# Patient Record
Sex: FEMALE | Race: WHITE | NOT HISPANIC OR LATINO | URBAN - METROPOLITAN AREA
[De-identification: names, ages, dates, MRNs, and addresses within clinical notes are randomized per-mention and may not be internally consistent; named-entity substitution may affect disease eponyms.]

---

## 2018-07-22 ENCOUNTER — INPATIENT (INPATIENT)
Facility: HOSPITAL | Age: 23
LOS: 1 days | Discharge: HOME | End: 2018-07-24
Attending: INTERNAL MEDICINE | Admitting: INTERNAL MEDICINE

## 2018-07-22 VITALS
HEART RATE: 98 BPM | RESPIRATION RATE: 20 BRPM | TEMPERATURE: 98 F | OXYGEN SATURATION: 100 % | WEIGHT: 169.98 LBS | DIASTOLIC BLOOD PRESSURE: 78 MMHG | SYSTOLIC BLOOD PRESSURE: 124 MMHG | HEIGHT: 66 IN

## 2018-07-22 LAB
ACETONE SERPL-MCNC: ABNORMAL
ALBUMIN SERPL ELPH-MCNC: 4.7 G/DL — SIGNIFICANT CHANGE UP (ref 3.5–5.2)
ALP SERPL-CCNC: 66 U/L — SIGNIFICANT CHANGE UP (ref 30–115)
ALT FLD-CCNC: 16 U/L — SIGNIFICANT CHANGE UP (ref 0–41)
ANION GAP SERPL CALC-SCNC: 26 MMOL/L — HIGH (ref 7–14)
APPEARANCE UR: CLEAR — SIGNIFICANT CHANGE UP
AST SERPL-CCNC: 37 U/L — SIGNIFICANT CHANGE UP (ref 0–41)
BASE EXCESS BLDV CALC-SCNC: -6.1 MMOL/L — LOW (ref -2–2)
BASOPHILS # BLD AUTO: 0.02 K/UL — SIGNIFICANT CHANGE UP (ref 0–0.2)
BASOPHILS NFR BLD AUTO: 0.1 % — SIGNIFICANT CHANGE UP (ref 0–1)
BILIRUB DIRECT SERPL-MCNC: <0.2 MG/DL — SIGNIFICANT CHANGE UP (ref 0–0.2)
BILIRUB INDIRECT FLD-MCNC: >0.6 MG/DL — SIGNIFICANT CHANGE UP (ref 0.2–1.2)
BILIRUB SERPL-MCNC: 0.8 MG/DL — SIGNIFICANT CHANGE UP (ref 0.2–1.2)
BILIRUB UR-MCNC: NEGATIVE — SIGNIFICANT CHANGE UP
BUN SERPL-MCNC: 18 MG/DL — SIGNIFICANT CHANGE UP (ref 10–20)
CA-I SERPL-SCNC: 1.17 MMOL/L — SIGNIFICANT CHANGE UP (ref 1.12–1.3)
CALCIUM SERPL-MCNC: 9.8 MG/DL — SIGNIFICANT CHANGE UP (ref 8.5–10.1)
CHLORIDE SERPL-SCNC: 91 MMOL/L — LOW (ref 98–110)
CO2 SERPL-SCNC: 17 MMOL/L — SIGNIFICANT CHANGE UP (ref 17–32)
COLOR SPEC: YELLOW — SIGNIFICANT CHANGE UP
CREAT SERPL-MCNC: 1.2 MG/DL — SIGNIFICANT CHANGE UP (ref 0.7–1.5)
DIFF PNL FLD: ABNORMAL
EOSINOPHIL # BLD AUTO: 0 K/UL — SIGNIFICANT CHANGE UP (ref 0–0.7)
EOSINOPHIL NFR BLD AUTO: 0 % — SIGNIFICANT CHANGE UP (ref 0–8)
GAS PNL BLDV: 134 MMOL/L — LOW (ref 136–145)
GAS PNL BLDV: SIGNIFICANT CHANGE UP
GLUCOSE SERPL-MCNC: 421 MG/DL — HIGH (ref 70–99)
GLUCOSE UR QL: >=1000
HCO3 BLDV-SCNC: 20 MMOL/L — LOW (ref 22–29)
HCT VFR BLD CALC: 38.7 % — SIGNIFICANT CHANGE UP (ref 37–47)
HCT VFR BLDA CALC: 42.3 % — SIGNIFICANT CHANGE UP (ref 34–44)
HGB BLD CALC-MCNC: 13.8 G/DL — LOW (ref 14–18)
HGB BLD-MCNC: 13.3 G/DL — SIGNIFICANT CHANGE UP (ref 12–16)
IMM GRANULOCYTES NFR BLD AUTO: 0.3 % — SIGNIFICANT CHANGE UP (ref 0.1–0.3)
KETONES UR-MCNC: >=80
LACTATE BLDV-MCNC: 2.9 MMOL/L — HIGH (ref 0.5–1.6)
LACTATE SERPL-SCNC: 3.1 MMOL/L — HIGH (ref 0.5–2.2)
LEUKOCYTE ESTERASE UR-ACNC: ABNORMAL
LIDOCAIN IGE QN: 12 U/L — SIGNIFICANT CHANGE UP (ref 7–60)
LYMPHOCYTES # BLD AUTO: 0.75 K/UL — LOW (ref 1.2–3.4)
LYMPHOCYTES # BLD AUTO: 4.8 % — LOW (ref 20.5–51.1)
MCHC RBC-ENTMCNC: 29.7 PG — SIGNIFICANT CHANGE UP (ref 27–31)
MCHC RBC-ENTMCNC: 34.4 G/DL — SIGNIFICANT CHANGE UP (ref 32–37)
MCV RBC AUTO: 86.4 FL — SIGNIFICANT CHANGE UP (ref 81–99)
MONOCYTES # BLD AUTO: 0.32 K/UL — SIGNIFICANT CHANGE UP (ref 0.1–0.6)
MONOCYTES NFR BLD AUTO: 2 % — SIGNIFICANT CHANGE UP (ref 1.7–9.3)
NEUTROPHILS # BLD AUTO: 14.48 K/UL — HIGH (ref 1.4–6.5)
NEUTROPHILS NFR BLD AUTO: 92.8 % — HIGH (ref 42.2–75.2)
NITRITE UR-MCNC: NEGATIVE — SIGNIFICANT CHANGE UP
NRBC # BLD: 0 /100 WBCS — SIGNIFICANT CHANGE UP (ref 0–0)
PCO2 BLDV: 43 MMHG — SIGNIFICANT CHANGE UP (ref 41–51)
PH BLDV: 7.29 — SIGNIFICANT CHANGE UP (ref 7.26–7.43)
PH UR: 6 — SIGNIFICANT CHANGE UP (ref 5–8)
PLATELET # BLD AUTO: 248 K/UL — SIGNIFICANT CHANGE UP (ref 130–400)
PO2 BLDV: 38 MMHG — SIGNIFICANT CHANGE UP (ref 20–40)
POTASSIUM BLDV-SCNC: 6 MMOL/L — HIGH (ref 3.3–5.6)
POTASSIUM SERPL-MCNC: 7 MMOL/L — CRITICAL HIGH (ref 3.5–5)
POTASSIUM SERPL-SCNC: 7 MMOL/L — CRITICAL HIGH (ref 3.5–5)
PROT SERPL-MCNC: 8.7 G/DL — HIGH (ref 6–8)
PROT UR-MCNC: NEGATIVE — SIGNIFICANT CHANGE UP
RBC # BLD: 4.48 M/UL — SIGNIFICANT CHANGE UP (ref 4.2–5.4)
RBC # FLD: 11.7 % — SIGNIFICANT CHANGE UP (ref 11.5–14.5)
SAO2 % BLDV: 66 % — SIGNIFICANT CHANGE UP
SODIUM SERPL-SCNC: 134 MMOL/L — LOW (ref 135–146)
SP GR SPEC: 1.02 — SIGNIFICANT CHANGE UP (ref 1.01–1.03)
UROBILINOGEN FLD QL: 0.2 — SIGNIFICANT CHANGE UP (ref 0.2–0.2)
WBC # BLD: 15.62 K/UL — HIGH (ref 4.8–10.8)
WBC # FLD AUTO: 15.62 K/UL — HIGH (ref 4.8–10.8)

## 2018-07-22 RX ORDER — INSULIN HUMAN 100 [IU]/ML
8 INJECTION, SOLUTION SUBCUTANEOUS ONCE
Qty: 0 | Refills: 0 | Status: DISCONTINUED | OUTPATIENT
Start: 2018-07-22 | End: 2018-07-22

## 2018-07-22 RX ORDER — SODIUM CHLORIDE 9 MG/ML
1000 INJECTION, SOLUTION INTRAVENOUS
Qty: 0 | Refills: 0 | Status: DISCONTINUED | OUTPATIENT
Start: 2018-07-22 | End: 2018-07-23

## 2018-07-22 RX ORDER — INSULIN HUMAN 100 [IU]/ML
7 INJECTION, SOLUTION SUBCUTANEOUS
Qty: 100 | Refills: 0 | Status: DISCONTINUED | OUTPATIENT
Start: 2018-07-22 | End: 2018-07-23

## 2018-07-22 RX ORDER — ONDANSETRON 8 MG/1
4 TABLET, FILM COATED ORAL ONCE
Qty: 0 | Refills: 0 | Status: COMPLETED | OUTPATIENT
Start: 2018-07-22 | End: 2018-07-22

## 2018-07-22 RX ORDER — SODIUM CHLORIDE 9 MG/ML
2000 INJECTION, SOLUTION INTRAVENOUS ONCE
Qty: 0 | Refills: 0 | Status: COMPLETED | OUTPATIENT
Start: 2018-07-22 | End: 2018-07-22

## 2018-07-22 RX ADMIN — ONDANSETRON 4 MILLIGRAM(S): 8 TABLET, FILM COATED ORAL at 21:42

## 2018-07-22 RX ADMIN — SODIUM CHLORIDE 2000 MILLILITER(S): 9 INJECTION, SOLUTION INTRAVENOUS at 21:42

## 2018-07-22 NOTE — ED ADULT NURSE NOTE - NSSISCREENINGQ1_ED_A_ED
Was the patient seen in the last year in this department? Yes     Does patient have an active prescription for medications requested? No     Received Request Via: Patient   No

## 2018-07-22 NOTE — ED PROVIDER NOTE - ATTENDING CONTRIBUTION TO CARE
I personally evaluated the patient. I reviewed the Resident’s or Physician Assistant’s note (as assigned above), and agree with the findings and plan except as documented in my note.    23 y/o F with PMH of DM1 on insulin pump presents with hyperglycemia (FS ~400s for 2 days), n/v. No fevers, chills. No abd pain. No back pain.     CONSTITUTIONAL: Patient visibly dehydrated.   SKIN: skin exam is warm and dry, no acute rash.  HEAD: Normocephalic; atraumatic.  EYES: PERRL, 3 mm bilateral, no nystagmus, EOM intact; conjunctiva and sclera clear.  ENT: No nasal discharge; airway clear.  NECK: Supple; non tender.+ full passive ROM in all directions. No JVD  CARD: S1, S2 normal; no murmurs, gallops, or rubs. Regular rate and rhythm. + Symmetric Strong Pulses  RESP: No wheezes, rales or rhonchi. Good air movement bilaterally  ABD: soft; non-distended; non-tender. No Rebound, No Gaurding, No signs of peritnitis, No CVA tenderness  EXT: Normal ROM. No clubbing, cyanosis or edema. Dp and Pt Pulses intact. Cap refill less than 3 seconds  Neuro: grossly intact    Plan:  Concerned for possible DKA. labs, fluids, reassess

## 2018-07-22 NOTE — ED PROVIDER NOTE - NS ED ROS FT
Review of Systems  Constitutional:  No fever, chills, malaise, generalized weakness.  Eyes:  No visual changes, eye pain, or discharge.  ENMT:  No hearing changes, pain, or discharge. No nasal congestion, discharge, or bleeding. No throat pain, swelling, or difficulty swallowing.  Cardiac:  No chest pain, palpitations, syncope, or edema.  Respiratory:  No dyspnea, orthopnea, stridor, wheezing, or cough. No hemoptysis.  GI:  No diarrhea, or abdominal pain. No melena or hematochezia. (+) nausea, vomiting  :  No dysuria, hematuria, frequency, or burning. Normal urine output.   MS:  No back pain.  Skin:  No skin rash, pruritis, jaundice, or lesions.  Neuro:  No headache, dizziness, loss of sensation, or focal weakness.  No change in mental status.   Endocrine: (+) thyroid disease, diabetes, celiac.

## 2018-07-22 NOTE — ED PROVIDER NOTE - PHYSICAL EXAMINATION
VITAL SIGNS: I have reviewed nursing notes and confirm.  CONSTITUTIONAL: Well-developed; well-nourished; in no acute distress.  SKIN: Skin exam is warm and dry, no acute rash.  HEAD: Normocephalic; atraumatic.  EYES: Conjunctiva and sclera clear.  ENT: No nasal discharge; airway clear.   NECK: Supple; non tender.  CARD: S1, S2 normal; no murmurs, gallops, or rubs. Regular rate and rhythm.  RESP: No wheezes, rales or rhonchi. Speaking in full sentences.   ABD: Normal bowel sounds; soft; non-distended; non-tender; No rebound or guarding. No CVA tenderness.   EXT: Normal ROM. No clubbing, cyanosis or edema.  NEURO: Alert, oriented. Grossly unremarkable. No focal deficits.

## 2018-07-22 NOTE — ED PROVIDER NOTE - MEDICAL DECISION MAKING DETAILS
23 y/o F with hx of DM1 presented with DKA. DKA treatment initiated. Patient admitted to ICU for further management and care.

## 2018-07-22 NOTE — ED PROVIDER NOTE - OBJECTIVE STATEMENT
21 yo F with PMHx of DM1, celiac disease and hypothyroidism presents to the ED c/o nausea, vomiting and hyperglycemia. Pt uses insulin pump and always doses herself accordingly but today sugars have been around 400. Pt has ketone meter at home and level was 2.4 which is elevated. Pt denies other symptoms. Pt denies fever, chills, abdominal pain, diarrhea, headache, dizziness, weakness, chest pain, SOB, back pain, LOC, trauma, urinary symptoms, cough, calf pain/swelling, recent travel, recent surgery.

## 2018-07-23 LAB
ALBUMIN SERPL ELPH-MCNC: 3.6 G/DL — SIGNIFICANT CHANGE UP (ref 3.5–5.2)
ALP SERPL-CCNC: 52 U/L — SIGNIFICANT CHANGE UP (ref 30–115)
ALT FLD-CCNC: 10 U/L — SIGNIFICANT CHANGE UP (ref 0–41)
ANION GAP SERPL CALC-SCNC: 12 MMOL/L — SIGNIFICANT CHANGE UP (ref 7–14)
ANION GAP SERPL CALC-SCNC: 15 MMOL/L — HIGH (ref 7–14)
ANION GAP SERPL CALC-SCNC: 15 MMOL/L — HIGH (ref 7–14)
ANION GAP SERPL CALC-SCNC: 17 MMOL/L — HIGH (ref 7–14)
APTT BLD: 23.8 SEC — CRITICAL LOW (ref 27–39.2)
AST SERPL-CCNC: 11 U/L — SIGNIFICANT CHANGE UP (ref 0–41)
BACTERIA # UR AUTO: ABNORMAL /HPF
BILIRUB SERPL-MCNC: 0.6 MG/DL — SIGNIFICANT CHANGE UP (ref 0.2–1.2)
BUN SERPL-MCNC: 10 MG/DL — SIGNIFICANT CHANGE UP (ref 10–20)
BUN SERPL-MCNC: 12 MG/DL — SIGNIFICANT CHANGE UP (ref 10–20)
BUN SERPL-MCNC: 14 MG/DL — SIGNIFICANT CHANGE UP (ref 10–20)
BUN SERPL-MCNC: 16 MG/DL — SIGNIFICANT CHANGE UP (ref 10–20)
CALCIUM SERPL-MCNC: 8.3 MG/DL — LOW (ref 8.5–10.1)
CALCIUM SERPL-MCNC: 8.3 MG/DL — LOW (ref 8.5–10.1)
CALCIUM SERPL-MCNC: 8.5 MG/DL — SIGNIFICANT CHANGE UP (ref 8.5–10.1)
CALCIUM SERPL-MCNC: 8.6 MG/DL — SIGNIFICANT CHANGE UP (ref 8.5–10.1)
CHLORIDE SERPL-SCNC: 100 MMOL/L — SIGNIFICANT CHANGE UP (ref 98–110)
CHLORIDE SERPL-SCNC: 100 MMOL/L — SIGNIFICANT CHANGE UP (ref 98–110)
CHLORIDE SERPL-SCNC: 98 MMOL/L — SIGNIFICANT CHANGE UP (ref 98–110)
CHLORIDE SERPL-SCNC: 98 MMOL/L — SIGNIFICANT CHANGE UP (ref 98–110)
CHOLEST SERPL-MCNC: 103 MG/DL — SIGNIFICANT CHANGE UP (ref 100–200)
CHOLEST SERPL-MCNC: 107 MG/DL — SIGNIFICANT CHANGE UP (ref 100–200)
CO2 SERPL-SCNC: 18 MMOL/L — SIGNIFICANT CHANGE UP (ref 17–32)
CO2 SERPL-SCNC: 20 MMOL/L — SIGNIFICANT CHANGE UP (ref 17–32)
CO2 SERPL-SCNC: 24 MMOL/L — SIGNIFICANT CHANGE UP (ref 17–32)
CO2 SERPL-SCNC: 24 MMOL/L — SIGNIFICANT CHANGE UP (ref 17–32)
CREAT SERPL-MCNC: 0.8 MG/DL — SIGNIFICANT CHANGE UP (ref 0.7–1.5)
CREAT SERPL-MCNC: 0.8 MG/DL — SIGNIFICANT CHANGE UP (ref 0.7–1.5)
CREAT SERPL-MCNC: 0.9 MG/DL — SIGNIFICANT CHANGE UP (ref 0.7–1.5)
CREAT SERPL-MCNC: 0.9 MG/DL — SIGNIFICANT CHANGE UP (ref 0.7–1.5)
EPI CELLS # UR: ABNORMAL /HPF
ESTIMATED AVERAGE GLUCOSE: 194 MG/DL — HIGH (ref 68–114)
GLUCOSE SERPL-MCNC: 195 MG/DL — HIGH (ref 70–99)
GLUCOSE SERPL-MCNC: 224 MG/DL — HIGH (ref 70–99)
GLUCOSE SERPL-MCNC: 242 MG/DL — HIGH (ref 70–99)
GLUCOSE SERPL-MCNC: 249 MG/DL — HIGH (ref 70–99)
HBA1C BLD-MCNC: 8.4 % — HIGH (ref 4–5.6)
HCT VFR BLD CALC: 31.2 % — LOW (ref 37–47)
HDLC SERPL-MCNC: 38 MG/DL — LOW (ref 40–125)
HDLC SERPL-MCNC: 38 MG/DL — LOW (ref 40–125)
HGB BLD-MCNC: 10.9 G/DL — LOW (ref 12–16)
INR BLD: 1.16 RATIO — SIGNIFICANT CHANGE UP (ref 0.65–1.3)
LIPID PNL WITH DIRECT LDL SERPL: 66 MG/DL — SIGNIFICANT CHANGE UP (ref 4–129)
LIPID PNL WITH DIRECT LDL SERPL: 70 MG/DL — SIGNIFICANT CHANGE UP (ref 4–129)
MAGNESIUM SERPL-MCNC: 1.7 MG/DL — LOW (ref 1.8–2.4)
MAGNESIUM SERPL-MCNC: 1.8 MG/DL — SIGNIFICANT CHANGE UP (ref 1.8–2.4)
MCHC RBC-ENTMCNC: 30.3 PG — SIGNIFICANT CHANGE UP (ref 27–31)
MCHC RBC-ENTMCNC: 34.9 G/DL — SIGNIFICANT CHANGE UP (ref 32–37)
MCV RBC AUTO: 86.7 FL — SIGNIFICANT CHANGE UP (ref 81–99)
NRBC # BLD: 0 /100 WBCS — SIGNIFICANT CHANGE UP (ref 0–0)
PHOSPHATE SERPL-MCNC: 2.3 MG/DL — SIGNIFICANT CHANGE UP (ref 2.1–4.9)
PHOSPHATE SERPL-MCNC: 2.7 MG/DL — SIGNIFICANT CHANGE UP (ref 2.1–4.9)
PLATELET # BLD AUTO: 203 K/UL — SIGNIFICANT CHANGE UP (ref 130–400)
POTASSIUM SERPL-MCNC: 3.8 MMOL/L — SIGNIFICANT CHANGE UP (ref 3.5–5)
POTASSIUM SERPL-MCNC: 3.8 MMOL/L — SIGNIFICANT CHANGE UP (ref 3.5–5)
POTASSIUM SERPL-MCNC: 4 MMOL/L — SIGNIFICANT CHANGE UP (ref 3.5–5)
POTASSIUM SERPL-MCNC: 4.3 MMOL/L — SIGNIFICANT CHANGE UP (ref 3.5–5)
POTASSIUM SERPL-SCNC: 3.8 MMOL/L — SIGNIFICANT CHANGE UP (ref 3.5–5)
POTASSIUM SERPL-SCNC: 3.8 MMOL/L — SIGNIFICANT CHANGE UP (ref 3.5–5)
POTASSIUM SERPL-SCNC: 4 MMOL/L — SIGNIFICANT CHANGE UP (ref 3.5–5)
POTASSIUM SERPL-SCNC: 4.3 MMOL/L — SIGNIFICANT CHANGE UP (ref 3.5–5)
PROT SERPL-MCNC: 6 G/DL — SIGNIFICANT CHANGE UP (ref 6–8)
PROTHROM AB SERPL-ACNC: 12.5 SEC — SIGNIFICANT CHANGE UP (ref 9.95–12.87)
RBC # BLD: 3.6 M/UL — LOW (ref 4.2–5.4)
RBC # FLD: 11.7 % — SIGNIFICANT CHANGE UP (ref 11.5–14.5)
RBC CASTS # UR COMP ASSIST: ABNORMAL /HPF
SODIUM SERPL-SCNC: 132 MMOL/L — LOW (ref 135–146)
SODIUM SERPL-SCNC: 133 MMOL/L — LOW (ref 135–146)
SODIUM SERPL-SCNC: 137 MMOL/L — SIGNIFICANT CHANGE UP (ref 135–146)
SODIUM SERPL-SCNC: 139 MMOL/L — SIGNIFICANT CHANGE UP (ref 135–146)
TOTAL CHOLESTEROL/HDL RATIO MEASUREMENT: 2.7 RATIO — LOW (ref 4–5.5)
TOTAL CHOLESTEROL/HDL RATIO MEASUREMENT: 2.8 RATIO — LOW (ref 4–5.5)
TRIGL SERPL-MCNC: 44 MG/DL — SIGNIFICANT CHANGE UP (ref 10–149)
TRIGL SERPL-MCNC: 45 MG/DL — SIGNIFICANT CHANGE UP (ref 10–149)
WBC # BLD: 13.47 K/UL — HIGH (ref 4.8–10.8)
WBC # FLD AUTO: 13.47 K/UL — HIGH (ref 4.8–10.8)
WBC UR QL: SIGNIFICANT CHANGE UP /HPF

## 2018-07-23 RX ORDER — INSULIN LISPRO 100/ML
8 VIAL (ML) SUBCUTANEOUS
Qty: 0 | Refills: 0 | Status: DISCONTINUED | OUTPATIENT
Start: 2018-07-23 | End: 2018-07-24

## 2018-07-23 RX ORDER — INSULIN LISPRO 100/ML
8 VIAL (ML) SUBCUTANEOUS ONCE
Qty: 0 | Refills: 0 | Status: COMPLETED | OUTPATIENT
Start: 2018-07-23 | End: 2018-07-23

## 2018-07-23 RX ORDER — MAGNESIUM SULFATE 500 MG/ML
1 VIAL (ML) INJECTION ONCE
Qty: 0 | Refills: 0 | Status: COMPLETED | OUTPATIENT
Start: 2018-07-23 | End: 2018-07-23

## 2018-07-23 RX ORDER — DEXTROSE 50 % IN WATER 50 %
12.5 SYRINGE (ML) INTRAVENOUS ONCE
Qty: 0 | Refills: 0 | Status: DISCONTINUED | OUTPATIENT
Start: 2018-07-23 | End: 2018-07-24

## 2018-07-23 RX ORDER — SODIUM CHLORIDE 9 MG/ML
1000 INJECTION, SOLUTION INTRAVENOUS
Qty: 0 | Refills: 0 | Status: DISCONTINUED | OUTPATIENT
Start: 2018-07-23 | End: 2018-07-23

## 2018-07-23 RX ORDER — ENOXAPARIN SODIUM 100 MG/ML
40 INJECTION SUBCUTANEOUS AT BEDTIME
Qty: 0 | Refills: 0 | Status: DISCONTINUED | OUTPATIENT
Start: 2018-07-23 | End: 2018-07-24

## 2018-07-23 RX ORDER — GLUCAGON INJECTION, SOLUTION 0.5 MG/.1ML
1 INJECTION, SOLUTION SUBCUTANEOUS ONCE
Qty: 0 | Refills: 0 | Status: DISCONTINUED | OUTPATIENT
Start: 2018-07-23 | End: 2018-07-24

## 2018-07-23 RX ORDER — INSULIN GLARGINE 100 [IU]/ML
25 INJECTION, SOLUTION SUBCUTANEOUS ONCE
Qty: 0 | Refills: 0 | Status: COMPLETED | OUTPATIENT
Start: 2018-07-23 | End: 2018-07-23

## 2018-07-23 RX ORDER — INSULIN GLARGINE 100 [IU]/ML
25 INJECTION, SOLUTION SUBCUTANEOUS AT BEDTIME
Qty: 0 | Refills: 0 | Status: DISCONTINUED | OUTPATIENT
Start: 2018-07-23 | End: 2018-07-24

## 2018-07-23 RX ORDER — INFLUENZA VIRUS VACCINE 15; 15; 15; 15 UG/.5ML; UG/.5ML; UG/.5ML; UG/.5ML
0.5 SUSPENSION INTRAMUSCULAR ONCE
Qty: 0 | Refills: 0 | Status: COMPLETED | OUTPATIENT
Start: 2018-07-23 | End: 2018-07-23

## 2018-07-23 RX ORDER — DEXTROSE 50 % IN WATER 50 %
15 SYRINGE (ML) INTRAVENOUS ONCE
Qty: 0 | Refills: 0 | Status: DISCONTINUED | OUTPATIENT
Start: 2018-07-23 | End: 2018-07-24

## 2018-07-23 RX ORDER — DEXTROSE 50 % IN WATER 50 %
25 SYRINGE (ML) INTRAVENOUS ONCE
Qty: 0 | Refills: 0 | Status: DISCONTINUED | OUTPATIENT
Start: 2018-07-23 | End: 2018-07-24

## 2018-07-23 RX ORDER — INSULIN LISPRO 100/ML
VIAL (ML) SUBCUTANEOUS
Qty: 0 | Refills: 0 | Status: DISCONTINUED | OUTPATIENT
Start: 2018-07-23 | End: 2018-07-24

## 2018-07-23 RX ORDER — LEVOTHYROXINE SODIUM 125 MCG
0 TABLET ORAL
Qty: 90 | Refills: 0 | COMMUNITY

## 2018-07-23 RX ADMIN — Medication 100 GRAM(S): at 12:37

## 2018-07-23 RX ADMIN — ENOXAPARIN SODIUM 40 MILLIGRAM(S): 100 INJECTION SUBCUTANEOUS at 22:42

## 2018-07-23 RX ADMIN — INSULIN GLARGINE 25 UNIT(S): 100 INJECTION, SOLUTION SUBCUTANEOUS at 22:42

## 2018-07-23 RX ADMIN — INSULIN HUMAN 7 UNIT(S)/HR: 100 INJECTION, SOLUTION SUBCUTANEOUS at 00:29

## 2018-07-23 RX ADMIN — Medication 8 UNIT(S): at 15:21

## 2018-07-23 RX ADMIN — Medication 6: at 16:28

## 2018-07-23 RX ADMIN — SODIUM CHLORIDE 200 MILLILITER(S): 9 INJECTION, SOLUTION INTRAVENOUS at 00:30

## 2018-07-23 RX ADMIN — INSULIN GLARGINE 25 UNIT(S): 100 INJECTION, SOLUTION SUBCUTANEOUS at 12:35

## 2018-07-23 RX ADMIN — Medication 8 UNIT(S): at 16:30

## 2018-07-23 NOTE — CHART NOTE - NSCHARTNOTEFT_GEN_A_CORE
23 yo F with PMHx of DM1, celiac disease and hypothyroidism presents to the ED c/o nausea, vomiting and hyperglycemia. Pt uses insulin pump and always doses herself accordingly but today sugars have been around 400. Pt has ketone meter at home and level was 2.4 which is elevated. Pt denies other symptoms. Pt denies fever, chills, abdominal pain, diarrhea, headache, dizziness, weakness, chest pain, SOB, back pain, LOC, trauma, urinary symptoms, cough, calf pain/swelling, recent travel, recent surgery.    #DKA:  pt was started on IV fluids ( shifted to d5 with nacl when blood glu < 200) >>then d/c fluids  PO diet  was on insulin drip was down to 25 units/hr >> shifted to Lantus 25 and Lispro 8/8/8 with moderate sliding scale  anion gap 12 at 10 am from 26 on admission   Hb a1c >> 8.4    Diet: CHO consistent diet     DVT ppx: Lovenox 40 mg sc once    f/u >>   BMP at 8 PM (ordered)  morning labs (ordered)  finger stich q 4hrs  Lantus 25 and Lispro 8/8/8 with moderate sliding scale  check finger stick before Lantus dose at night

## 2018-07-23 NOTE — PROGRESS NOTE ADULT - ASSESSMENT
23 yo F with PMHx of DM1, celiac disease and hypothyroidism presents to the ED c/o nausea, vomiting and hyperglycemia. Pt uses insulin pump and always doses herself accordingly but today sugars have been around 400. Pt has ketone meter at home and level was 2.4 which is elevated. Pt denies other symptoms. Pt denies fever, chills, abdominal pain, diarrhea, headache, dizziness, weakness, chest pain, SOB, back pain, LOC, trauma, urinary symptoms, cough, calf pain/swelling, recent travel, recent surgery.    #DKA:  pt was IV fluids ( shifted to d5 with nacl) >> d/c fluids  PO diet  was on insulin drip >> shifted to Lantus 25 and Lispro 8/8/8 with moderate sliding scale  anion gap 12 at 10 am from 26 on admission   Hb a1c >> 8.4    Diet: CHO consistent diet     DVT ppx: Lovenox 40 mg sc once

## 2018-07-23 NOTE — PROGRESS NOTE ADULT - SUBJECTIVE AND OBJECTIVE BOX
21 yo F with PMHx of DM1, celiac disease and hypothyroidism presents to the ED c/o nausea, vomiting and hyperglycemia. Pt uses insulin pump and always doses herself accordingly but today sugars have been around 400. Pt has ketone meter at home and level was 2.4 which is elevated. Pt denies other symptoms. Pt denies fever, chills, abdominal pain, diarrhea, headache, dizziness, weakness, chest pain, SOB, back pain, LOC, trauma, urinary symptoms, cough, calf pain/swelling, recent travel, recent surgery.    Over Night Events:    No major over night events    ROS:  See HPI    PHYSICAL EXAM    General: not in acute distress             Lungs: Bilateral BS  Cardiovascular: Regular , normal s1/s2 , no murmurs   Abdomen: Soft, non tender Positive BS  Extremities: No ll edema  Skin: Warm  Neurological: Non focal     ICU Vital Signs Last 24 Hrs  T(C): 36.8 (2018 12:00), Max: 37.5 (2018 23:18)  T(F): 98.2 (2018 12:00), Max: 99.5 (2018 23:18)  HR: 88 (2018 14:00) (78 - 100)  BP: 99/59 (2018 14:00) (87/57 - 124/78)  BP(mean): 74 (2018 14:00) (63 - 82)  ABP: --  ABP(mean): --  RR: 21 (2018 14:00) (11 - 21)  SpO2: 100% (2018 14:00) (98% - 100%)      18 @ 07:01  -  18 @ 07:00  --------------------------------------------------------  IN:    dextrose 5% + sodium chloride 0.45%.: 500 mL    insulin Infusion: 42 mL    lactated ringers.: 400 mL    sodium chloride 0.45%: 500 mL  Total IN: 1442 mL    OUT:    Voided: 100 mL  Total OUT: 100 mL    Total NET: 1342 mL      18 @ 07:01  -  18 @ 16:02  --------------------------------------------------------  IN:    dextrose 5% + sodium chloride 0.45%.: 1500 mL    insulin Infusion: 23 mL    IV PiggyBack: 100 mL  Total IN: 1623 mL    OUT:  Total OUT: 0 mL    Total NET: 1623 mL          LABS:                            10.9   13.47 )-----------( 203      ( 2018 04:31 )             31.2                                               07    132<L>  |  100  |  14  ----------------------------<  195<H>  3.8   |  20  |  0.9    Ca    8.3<L>      2018 10:10  Phos  2.3       Mg     1.8         TPro  6.0  /  Alb  3.6  /  TBili  0.6  /  DBili  x   /  AST  11  /  ALT  10  /  AlkPhos  52  0723      PT/INR - ( 2018 04:31 )   PT: 12.50 sec;   INR: 1.16 ratio         PTT - ( 2018 04:31 )  PTT:23.8 sec                                       Urinalysis Basic - ( 2018 20:53 )    Color: Yellow / Appearance: Clear / S.025 / pH: x  Gluc: x / Ketone: >=80  / Bili: Negative / Urobili: 0.2   Blood: x / Protein: Negative / Nitrite: Negative   Leuk Esterase: Trace / RBC: 10-25 /HPF / WBC 3-5 /HPF   Sq Epi: x / Non Sq Epi: Moderate /HPF / Bacteria: Moderate /HPF                                                  LIVER FUNCTIONS - ( 2018 04:31 )  Alb: 3.6 g/dL / Pro: 6.0 g/dL / ALK PHOS: 52 U/L / ALT: 10 U/L / AST: 11 U/L / GGT: x                                                                                                                                       MEDICATIONS  (STANDING):  dextrose 50% Injectable 12.5 Gram(s) IV Push once  dextrose 50% Injectable 25 Gram(s) IV Push once  dextrose 50% Injectable 25 Gram(s) IV Push once  enoxaparin Injectable 40 milliGRAM(s) SubCutaneous at bedtime  insulin glargine Injectable (LANTUS) 25 Unit(s) SubCutaneous at bedtime  insulin lispro (HumaLOG) corrective regimen sliding scale   SubCutaneous three times a day before meals  insulin lispro Injectable (HumaLOG) 8 Unit(s) SubCutaneous three times a day before meals    MEDICATIONS  (PRN):  dextrose 40% Gel 15 Gram(s) Oral once PRN Blood Glucose LESS THAN 70 milliGRAM(s)/deciliter  glucagon  Injectable 1 milliGRAM(s) IntraMuscular once PRN Glucose LESS THAN 70 milligrams/deciliter      Xrays:                                                                                     ECHO

## 2018-07-23 NOTE — CONSULT NOTE ADULT - ASSESSMENT
IMPRESSION:    DKA - improved  Hx of DM1      PLAN:    CNS: no depressants    HEENT: Oral care    PULMONARY:  HOB @ 45 degrees    CARDIOVASCULAR: C/W IVF fluids for now    GI: GI prophylaxis.  Feeding PO diet    RENAL:  Follow up lytes.  Correct as needed. BMP at 11h.     INFECTIOUS DISEASE: Follow up cultures.    HEMATOLOGICAL:  DVT prophylaxis.    ENDOCRINE:  On insulin drip. Once AG closes, bridge to SQ insulin and DC IVF. Endocrinology follow up.    MUSCULOSKELETAL: out of bed    Downgrade this PM.

## 2018-07-23 NOTE — CONSULT NOTE ADULT - SUBJECTIVE AND OBJECTIVE BOX
21 yo F with PMHx of DM1, celiac disease and hypothyroidism presents to the ED c/o nausea, vomiting and hyperglycemia. Pt uses insulin pump and always doses herself accordingly but today sugars have been around 400. Pt has ketone meter at home and level was 2.4 which is elevated. Pt denies other symptoms. Pt denies fever, chills, abdominal pain, diarrhea, headache, dizziness, weakness, chest pain, SOB, back pain, LOC, trauma, urinary symptoms, cough, calf pain/swelling, recent travel, recent surgery. (2018 01:14)      PAST MEDICAL & SURGICAL HISTORY:  Celiac disease  DM (diabetes mellitus)  No significant past surgical history      SOCIAL HX:   Smoking neg                        ETOH neg                           Other neg    FAMILY HISTORY:  No pertinent family history in first degree relatives      ROS:  See HPI     Allergies    Gluten (Stomach Upset)  No Known Drug Allergies    Intolerances          PHYSICAL EXAM    ICU Vital Signs Last 24 Hrs  T(C): 36.7 (2018 04:00), Max: 37.5 (2018 23:18)  T(F): 98.1 (2018 04:00), Max: 99.5 (2018 23:18)  HR: 92 (2018 07:00) (80 - 100)  BP: 95/68 (2018 07:00) (94/49 - 124/78)  BP(mean): 77 (2018 07:00) (63 - 82)  ABP: --  ABP(mean): --  RR: 18 (2018 07:00) (11 - 20)  SpO2: 100% (2018 07:00) (98% - 100%)      General: in NAD  HEENT:  SOPHIA              Lymph Nodes: No cervical LN   Lungs: Bilateral BS, clear  Cardiovascular: Regular  Abdomen: Soft, Positive BS  Extremities: No clubbing, no cyanosis, no edema  Skin: Warm  Neurological: Non focal       18 @ 07:01  -  18 @ 07:00  --------------------------------------------------------  IN:    dextrose 5% + sodium chloride 0.45%.: 500 mL    insulin Infusion: 42 mL    lactated ringers.: 400 mL    sodium chloride 0.45%: 500 mL  Total IN: 1442 mL    OUT:    Voided: 100 mL  Total OUT: 100 mL    Total NET: 1342 mL          LABS:                          10.9   13.47 )-----------( 203      ( 2018 04:31 )             31.2                                               07-23    133<L>  |  100  |  16  ----------------------------<  224<H>  4.0   |  18  |  0.9    Ca    8.5      2018 04:31  Phos  2.7       Mg     1.7         TPro  6.0  /  Alb  3.6  /  TBili  0.6  /  DBili  x   /  AST  11  /  ALT  10  /  AlkPhos  52        PT/INR - ( 2018 04:31 )   PT: 12.50 sec;   INR: 1.16 ratio         PTT - ( 2018 04:31 )  PTT:23.8 sec                                       Urinalysis Basic - ( 2018 20:53 )    Color: Yellow / Appearance: Clear / S.025 / pH: x  Gluc: x / Ketone: >=80  / Bili: Negative / Urobili: 0.2   Blood: x / Protein: Negative / Nitrite: Negative   Leuk Esterase: Trace / RBC: 10-25 /HPF / WBC 3-5 /HPF   Sq Epi: x / Non Sq Epi: Moderate /HPF / Bacteria: Moderate /HPF                                                  LIVER FUNCTIONS - ( 2018 04:31 )  Alb: 3.6 g/dL / Pro: 6.0 g/dL / ALK PHOS: 52 U/L / ALT: 10 U/L / AST: 11 U/L / GGT: x                                                                                                                                                    MEDICATIONS  (STANDING):  dextrose 5% + sodium chloride 0.45%. 1000 milliLiter(s) (250 mL/Hr) IV Continuous <Continuous>  enoxaparin Injectable 40 milliGRAM(s) SubCutaneous at bedtime  insulin Infusion 7 Unit(s)/Hr (7 mL/Hr) IV Continuous <Continuous>    MEDICATIONS  (PRN):

## 2018-07-23 NOTE — H&P ADULT - NSHPLABSRESULTS_GEN_ALL_CORE
13.3   15.62 )-----------( 248      ( 2018 20:53 )             38.7       07-    134<L>  |  91<L>  |  18  ----------------------------<  421<H>  7.0<HH>   |  17  |  1.2    Ca    9.8      2018 20:53    TPro  8.7<H>  /  Alb  4.7  /  TBili  0.8  /  DBili  <0.2  /  AST  37  /  ALT  16  /  AlkPhos  66        LIVER FUNCTIONS - ( 2018 20:53 )  Alb: 4.7 g/dL / Pro: 8.7 g/dL / ALK PHOS: 66 U/L / ALT: 16 U/L / AST: 37 U/L / GGT: x             Urinalysis Basic - ( 2018 20:53 )    Color: Yellow / Appearance: Clear / S.025 / pH: x  Gluc: x / Ketone: >=80  / Bili: Negative / Urobili: 0.2   Blood: x / Protein: Negative / Nitrite: Negative   Leuk Esterase: Trace / RBC: 10-25 /HPF / WBC 3-5 /HPF   Sq Epi: x / Non Sq Epi: Moderate /HPF / Bacteria: Moderate /HPF

## 2018-07-23 NOTE — CONSULT NOTE ADULT - SUBJECTIVE AND OBJECTIVE BOX
History and Physical:   Source of Information	Chart(s), Patient	       History of Present Illness:  History of Present Illness: 	  "21 yo F with PMHx of DM1, celiac disease and hypothyroidism presents to the ED c/o nausea, vomiting and hyperglycemia. Pt uses insulin pump and always doses herself accordingly but today sugars have been around 400. Pt has ketone meter at home and level was 2.4 which is elevated. Pt denies other symptoms. Pt denies fever, chills, abdominal pain, diarrhea, headache, dizziness, weakness, chest pain, SOB, back pain, LOC, trauma, urinary symptoms, cough, calf pain/swelling, recent travel, recent surgery."    She reports that prior to this event, her blood sugars had been well controlled.   However, she was jumping on a trampoline and feels that the pod became dislodged from her skin.  She did not change the pod, but rather tried to correct the resultant elevated blood sugars with correction doses.  Unfortunately, due to the pod being dislodged this did not work, and she decompensated.     Review of Systems:  Review of Systems: see HPI	  Other Review of Systems: All other review of systems negative, except as noted in HPI	      Allergies and Intolerances:        Allergies:  	No Known Drug Allergies:   	Gluten: Food, Stomach Upset    Home Medications:   * Outpatient Medication Status not yet specified    .    Patient History:    Past Medical History:  Celiac disease    DM (diabetes mellitus).     Past Surgical History:  No significant past surgical history.     Family History:  No pertinent family history in first degree relatives.     Social History:  Social History (marital status, living situation, occupation, tobacco use, alcohol and drug use, and sexual history): deniesx3	     Tobacco Screening:  · Core Measure Site	No	  · Has the patient used tobacco in the past 30 days?	No	    Risk Assessment:    Present on Admission:  Deep Venous Thrombosis	no	  Pulmonary Embolus	no	  Urinary Catheter	no	  Central Venous Catheter/PICC Line	no	  Surgical Site Incision	no	  Pressure Ulcer(s)	no	     Heart Failure:  Does this patient have a history of or has been diagnosed with heart failure? no.    HIV Screen (per [facility] Department of Health, HIV screening must be offered to every individual between ages 13 and 64)	Offered and patient declined	       Physical Exam:  Physical Exam: Gen-NAD  Cardio-normal S1,S2  Pulm- CTA B/L Abd- NT, ND Ext: no c/c/e, DP 2+ bilaterally Neuro: normal psych:[id number]	       Labs and Results:  Labs, Radiology, Cardiology, and Other Results: 13.3   15.62 )-----------( 248      ( 2018 20:53 )             38.7     -   134<L>  |  91<L>  |  18  ----------------------------<  421<H>  7.0<HH>   |  17  |  1.2   Ca    9.8      2018 20:53   TPro  8.7<H>  /  Alb  4.7  /  TBili  0.8  /  DBili  <0.2  /  AST  37  /  ALT  16  /  AlkPhos  66      LIVER FUNCTIONS - ( 2018 20:53 )  Alb: 4.7 g/dL / Pro: 8.7 g/dL / ALK PHOS: 66 U/L / ALT: 16 U/L / AST: 37 U/L / GGT: x           Urinalysis Basic - ( 2018 20:53 )   Color: Yellow / Appearance: Clear / S.025 / pH: x  Gluc: x / Ketone: >=80  / Bili: Negative / Urobili: 0.2   Blood: x / Protein: Negative / Nitrite: Negative   Leuk Esterase: Trace / RBC: 10-25 /HPF / WBC 3-5 /HPF  Sq Epi: x / Non Sq Epi: Moderate /HPF / Bacteria: Moderate /HPF	    Assessment and Plan:    Assessment:  · Assessment		  21 yo F with PMHx of DM1 admitted with DKA  Anion gap closed and she's now on the floor    c/w lantus 25 units per day  c/w humalog 8 units tid ac + correction    ok for discharge in AM  pt should restart insulin pump 22 hours after last dose of lantus insulin.

## 2018-07-24 ENCOUNTER — TRANSCRIPTION ENCOUNTER (OUTPATIENT)
Age: 23
End: 2018-07-24

## 2018-07-24 VITALS
HEART RATE: 84 BPM | SYSTOLIC BLOOD PRESSURE: 132 MMHG | DIASTOLIC BLOOD PRESSURE: 79 MMHG | OXYGEN SATURATION: 97 % | TEMPERATURE: 97 F | RESPIRATION RATE: 18 BRPM

## 2018-07-24 LAB
ANION GAP SERPL CALC-SCNC: 13 MMOL/L — SIGNIFICANT CHANGE UP (ref 7–14)
ANION GAP SERPL CALC-SCNC: 15 MMOL/L — HIGH (ref 7–14)
BASOPHILS # BLD AUTO: 0.01 K/UL — SIGNIFICANT CHANGE UP (ref 0–0.2)
BASOPHILS NFR BLD AUTO: 0.1 % — SIGNIFICANT CHANGE UP (ref 0–1)
BUN SERPL-MCNC: 8 MG/DL — LOW (ref 10–20)
BUN SERPL-MCNC: 9 MG/DL — LOW (ref 10–20)
CALCIUM SERPL-MCNC: 7.9 MG/DL — LOW (ref 8.5–10.1)
CALCIUM SERPL-MCNC: 8.6 MG/DL — SIGNIFICANT CHANGE UP (ref 8.5–10.1)
CHLORIDE SERPL-SCNC: 101 MMOL/L — SIGNIFICANT CHANGE UP (ref 98–110)
CHLORIDE SERPL-SCNC: 102 MMOL/L — SIGNIFICANT CHANGE UP (ref 98–110)
CO2 SERPL-SCNC: 21 MMOL/L — SIGNIFICANT CHANGE UP (ref 17–32)
CO2 SERPL-SCNC: 22 MMOL/L — SIGNIFICANT CHANGE UP (ref 17–32)
CREAT SERPL-MCNC: 0.7 MG/DL — SIGNIFICANT CHANGE UP (ref 0.7–1.5)
CREAT SERPL-MCNC: 0.8 MG/DL — SIGNIFICANT CHANGE UP (ref 0.7–1.5)
EOSINOPHIL # BLD AUTO: 0.05 K/UL — SIGNIFICANT CHANGE UP (ref 0–0.7)
EOSINOPHIL NFR BLD AUTO: 0.7 % — SIGNIFICANT CHANGE UP (ref 0–8)
GLUCOSE SERPL-MCNC: 237 MG/DL — HIGH (ref 70–99)
GLUCOSE SERPL-MCNC: 244 MG/DL — HIGH (ref 70–99)
HCT VFR BLD CALC: 33.5 % — LOW (ref 37–47)
HGB BLD-MCNC: 11.4 G/DL — LOW (ref 12–16)
IMM GRANULOCYTES NFR BLD AUTO: 0.1 % — SIGNIFICANT CHANGE UP (ref 0.1–0.3)
LYMPHOCYTES # BLD AUTO: 2.11 K/UL — SIGNIFICANT CHANGE UP (ref 1.2–3.4)
LYMPHOCYTES # BLD AUTO: 31.4 % — SIGNIFICANT CHANGE UP (ref 20.5–51.1)
MAGNESIUM SERPL-MCNC: 1.7 MG/DL — LOW (ref 1.8–2.4)
MCHC RBC-ENTMCNC: 29.5 PG — SIGNIFICANT CHANGE UP (ref 27–31)
MCHC RBC-ENTMCNC: 34 G/DL — SIGNIFICANT CHANGE UP (ref 32–37)
MCV RBC AUTO: 86.8 FL — SIGNIFICANT CHANGE UP (ref 81–99)
MONOCYTES # BLD AUTO: 0.37 K/UL — SIGNIFICANT CHANGE UP (ref 0.1–0.6)
MONOCYTES NFR BLD AUTO: 5.5 % — SIGNIFICANT CHANGE UP (ref 1.7–9.3)
NEUTROPHILS # BLD AUTO: 4.17 K/UL — SIGNIFICANT CHANGE UP (ref 1.4–6.5)
NEUTROPHILS NFR BLD AUTO: 62.2 % — SIGNIFICANT CHANGE UP (ref 42.2–75.2)
NRBC # BLD: 0 /100 WBCS — SIGNIFICANT CHANGE UP (ref 0–0)
PHOSPHATE SERPL-MCNC: 2.2 MG/DL — SIGNIFICANT CHANGE UP (ref 2.1–4.9)
PLATELET # BLD AUTO: 216 K/UL — SIGNIFICANT CHANGE UP (ref 130–400)
POTASSIUM SERPL-MCNC: 3.9 MMOL/L — SIGNIFICANT CHANGE UP (ref 3.5–5)
POTASSIUM SERPL-MCNC: 4 MMOL/L — SIGNIFICANT CHANGE UP (ref 3.5–5)
POTASSIUM SERPL-SCNC: 3.9 MMOL/L — SIGNIFICANT CHANGE UP (ref 3.5–5)
POTASSIUM SERPL-SCNC: 4 MMOL/L — SIGNIFICANT CHANGE UP (ref 3.5–5)
RBC # BLD: 3.86 M/UL — LOW (ref 4.2–5.4)
RBC # FLD: 12.1 % — SIGNIFICANT CHANGE UP (ref 11.5–14.5)
SODIUM SERPL-SCNC: 137 MMOL/L — SIGNIFICANT CHANGE UP (ref 135–146)
SODIUM SERPL-SCNC: 137 MMOL/L — SIGNIFICANT CHANGE UP (ref 135–146)
WBC # BLD: 6.72 K/UL — SIGNIFICANT CHANGE UP (ref 4.8–10.8)
WBC # FLD AUTO: 6.72 K/UL — SIGNIFICANT CHANGE UP (ref 4.8–10.8)

## 2018-07-24 RX ORDER — LEVOTHYROXINE SODIUM 125 MCG
88 TABLET ORAL DAILY
Qty: 0 | Refills: 0 | Status: DISCONTINUED | OUTPATIENT
Start: 2018-07-24 | End: 2018-07-24

## 2018-07-24 RX ORDER — INSULIN LISPRO 100/ML
8 VIAL (ML) SUBCUTANEOUS
Qty: 200 | Refills: 0 | OUTPATIENT
Start: 2018-07-24

## 2018-07-24 RX ORDER — INSULIN DEGLUDEC 100 U/ML
0 INJECTION, SOLUTION SUBCUTANEOUS
Qty: 15 | Refills: 0 | COMMUNITY

## 2018-07-24 RX ORDER — INSULIN GLARGINE 100 [IU]/ML
25 INJECTION, SOLUTION SUBCUTANEOUS
Qty: 50 | Refills: 0 | OUTPATIENT
Start: 2018-07-24 | End: 2018-07-24

## 2018-07-24 RX ORDER — INSULIN ASPART 100 [IU]/ML
0 INJECTION, SOLUTION SUBCUTANEOUS
Qty: 70 | Refills: 0 | COMMUNITY

## 2018-07-24 RX ADMIN — Medication 88 MICROGRAM(S): at 06:00

## 2018-07-24 RX ADMIN — Medication 8 UNIT(S): at 08:23

## 2018-07-24 RX ADMIN — Medication 8 UNIT(S): at 11:22

## 2018-07-24 RX ADMIN — Medication 4: at 11:22

## 2018-07-24 RX ADMIN — Medication 4: at 08:22

## 2018-07-24 NOTE — DISCHARGE NOTE ADULT - NSCORESITESY/N_GEN_A_CORE_RD
-Sinsu tach, 110-15 on my arrival  -no chest pain  -will send repeat labs (BMP, mag, phos)  -low dose beta blocker with hold parameters  -cardiology following No

## 2018-07-24 NOTE — DISCHARGE NOTE ADULT - PATIENT PORTAL LINK FT
You can access the [facility] Patient Portal, offered by [facility], by registering with the following website: [email or web address]

## 2018-07-24 NOTE — DISCHARGE NOTE ADULT - MEDICATION SUMMARY - MEDICATIONS TO TAKE
I will START or STAY ON the medications listed below when I get home from the hospital:    insulin glargine 100 units/mL subcutaneous solution  -- 25 unit(s) subcutaneous once a day (at bedtime)   Take one dose of 25 U of Lantus tonight and stop taking it.  -- Do not drink alcoholic beverages when taking this medication.  It is very important that you take or use this exactly as directed.  Do not skip doses or discontinue unless directed by your doctor.  Keep in refrigerator.  Do not freeze.    -- Indication: For Type 1 DM    insulin lispro 100 units/mL injectable solution  -- 8 unit(s) injectable 3 times a day (before meals)   -- Indication: For Type 1 DM    SYNTHROID 88 MCG TABLET  -- Indication: For Hypothyroidism

## 2018-07-24 NOTE — DISCHARGE NOTE ADULT - CARE PLAN
Principal Discharge DX:	DM (diabetes mellitus)  Goal:	Prevent Hyperglycemia and damage to body organs  Assessment and plan of treatment:	You will have to take Lantus 25 units per day as well as Humalog 8 units 3 times a day before meals with correction if needed.  22 hours after the last dose of Lantus insulin, you will have to restart on insulin pump.  You will have to follow up with Dr. Mejias in 1 week  Secondary Diagnosis:	Celiac disease  Goal:	Prevent abdominal symptoms when eating food  Assessment and plan of treatment:	You will have to stick with your gluten free diet that you are already following.  Secondary Diagnosis:	Hypothyroidism  Goal:	Replete thyroid hormones  Assessment and plan of treatment:	You will have to take Synthroid as indicated

## 2018-07-24 NOTE — DISCHARGE NOTE ADULT - HOSPITAL COURSE
Patient is a 21 yo F who was admitted to [facility] for diabetic ketoacidosis without coma associated with T1DM. She came to the ED after having nausea and vomiting along with a FBS of 400. Her ketone meter was 2.4 (elevated). She was denying any fever, chills ,abdominal pain or diarrhea. She was admitted to ICU where she was given IV fluid ( shifted to D5 with NaCl). She was on insulin drip which was shifted to Lantus 25 Units per day and insulin lispro 8 units tid with moderate sliding scale. Anion gap was closed and came down from 26 on admission to 12 on 7/23/18. Her HbA1c is >8.4. She was then downgraded to the floor after being stabilized for her blood sugar. She did not have any complaints during her night on the floor. She was eating and drinking as usual, walking and passing stools and urine. She is going home, assisted with her father.

## 2018-07-24 NOTE — DISCHARGE NOTE ADULT - MEDICATION SUMMARY - MEDICATIONS TO STOP TAKING
I will STOP taking the medications listed below when I get home from the hospital:    NOVOLOG 100 UNIT/ML VIAL    TRESIBA FLEXTOUCH 100 UNITS/ML

## 2018-07-24 NOTE — DISCHARGE NOTE ADULT - CARE PROVIDER_API CALL
Phu Mejias), EndocrinologyMetabDiabetes; Internal Medicine  [address] [place] [address]  Phone: [phone]  Fax: [phone]

## 2018-07-24 NOTE — DISCHARGE NOTE ADULT - PLAN OF CARE
Prevent Hyperglycemia and damage to body organs You will have to take Lantus 25 units per day as well as Humalog 8 units 3 times a day before meals with correction if needed.  22 hours after the last dose of Lantus insulin, you will have to restart on insulin pump.  You will have to follow up with Dr. Mejias in 1 week Prevent abdominal symptoms when eating food You will have to stick with your gluten free diet that you are already following. Replete thyroid hormones You will have to take Synthroid as indicated

## 2018-07-27 DIAGNOSIS — E10.10 TYPE 1 DIABETES MELLITUS WITH KETOACIDOSIS WITHOUT COMA: ICD-10-CM

## 2018-07-27 DIAGNOSIS — K90.0 CELIAC DISEASE: ICD-10-CM

## 2018-07-27 DIAGNOSIS — Z96.41 PRESENCE OF INSULIN PUMP (EXTERNAL) (INTERNAL): ICD-10-CM

## 2018-07-27 DIAGNOSIS — Z79.4 LONG TERM (CURRENT) USE OF INSULIN: ICD-10-CM

## 2018-07-27 DIAGNOSIS — E03.9 HYPOTHYROIDISM, UNSPECIFIED: ICD-10-CM

## 2024-07-01 NOTE — PATIENT PROFILE ADULT. - PROVIDER NOTIFICATION
Detail Level: Detailed Quality 130: Documentation Of Current Medications In The Medical Record: Current Medications Documented Quality 226: Preventive Care And Screening: Tobacco Use: Screening And Cessation Intervention: Patient screened for tobacco use and is an ex/non-smoker Declines